# Patient Record
Sex: MALE | Race: WHITE | NOT HISPANIC OR LATINO
[De-identification: names, ages, dates, MRNs, and addresses within clinical notes are randomized per-mention and may not be internally consistent; named-entity substitution may affect disease eponyms.]

---

## 2019-09-14 ENCOUNTER — TRANSCRIPTION ENCOUNTER (OUTPATIENT)
Age: 64
End: 2019-09-14

## 2019-09-15 ENCOUNTER — EMERGENCY (EMERGENCY)
Facility: HOSPITAL | Age: 64
LOS: 1 days | Discharge: HOME | End: 2019-09-15
Attending: EMERGENCY MEDICINE | Admitting: EMERGENCY MEDICINE
Payer: COMMERCIAL

## 2019-09-15 VITALS
HEIGHT: 73 IN | RESPIRATION RATE: 15 BRPM | HEART RATE: 55 BPM | DIASTOLIC BLOOD PRESSURE: 71 MMHG | SYSTOLIC BLOOD PRESSURE: 125 MMHG | OXYGEN SATURATION: 97 % | TEMPERATURE: 98 F | WEIGHT: 184.97 LBS

## 2019-09-15 PROCEDURE — 99284 EMERGENCY DEPT VISIT MOD MDM: CPT

## 2019-09-15 PROCEDURE — 13131 CMPLX RPR F/C/C/M/N/AX/G/H/F: CPT

## 2019-09-15 PROCEDURE — 99285 EMERGENCY DEPT VISIT HI MDM: CPT | Mod: 25

## 2019-09-15 PROCEDURE — 13152 CMPLX RPR E/N/E/L 2.6-7.5 CM: CPT

## 2019-09-15 RX ORDER — TETANUS TOXOID, REDUCED DIPHTHERIA TOXOID AND ACELLULAR PERTUSSIS VACCINE, ADSORBED 5; 2.5; 8; 8; 2.5 [IU]/.5ML; [IU]/.5ML; UG/.5ML; UG/.5ML; UG/.5ML
0.5 SUSPENSION INTRAMUSCULAR ONCE
Refills: 0 | Status: COMPLETED | OUTPATIENT
Start: 2019-09-15 | End: 2019-09-15

## 2019-09-15 NOTE — ED PROVIDER NOTE - CARE PLAN
Breath sounds clear and equal bilaterally.
Principal Discharge DX:	Facial laceration, initial encounter

## 2019-09-15 NOTE — ED PROVIDER NOTE - PATIENT PORTAL LINK FT
You can access the FollowMyHealth Patient Portal offered by [facility] by registering at the following website: [email or web address]. By joining [facility]’s FollowMyHealth portal, you will also be able to view your health information using other applications (apps) compatible with our system.

## 2019-09-15 NOTE — ED ADULT NURSE NOTE - CHIEF COMPLAINT QUOTE
"I fell and hit my head on a stone" patient stated he tripped and fall, no loc, noted a lac on his left eyebrow. MD Ansari aware. "I fell and hit my head on a stone" patient stated he tripped and fall, no loc, noted a lac on his left eyebrow. MD Collado aware.

## 2019-09-15 NOTE — ED PROVIDER NOTE - CLINICAL SUMMARY MEDICAL DECISION MAKING FREE TEXT BOX
left eyebrow lac after fall today. sent to meet plastics for repair. will update tetanus. due to history of factor XI, recommend CT. patient states he consulted his own PCP PTA and was in agreement to avoid CT at this time due to no symptoms or HA. will return immediately for any worsening pain or symptoms. no vert tenderness to suggest vert fx

## 2019-09-15 NOTE — ED ADULT NURSE NOTE - OBJECTIVE STATEMENT
patient fell and has a lac on his left eyebrow, bleeding controlled, refused tetanus shot and ct of head, MD Ansari/plastic surgery aware and on his way for the patient, no loc, c/o minimal pain, girl friend at bedside. will continue to monitor.

## 2019-09-15 NOTE — ED ADULT TRIAGE NOTE - CHIEF COMPLAINT QUOTE
"I fell and hit my head on a stone" patient stated he tripped and fall, no loc, noted a lac on his left eyebrow. MD Ansari aware.

## 2019-09-15 NOTE — ED PROVIDER NOTE - OBJECTIVE STATEMENT
64 year old male with history of Factor XI presents with facial laceration. tripped and fell at home in backyard about 3 hours ago. lac to left eyebrow. no LOC. does not take any blood thinners. no HA, dizziness, confusion, memory loss, or blurred vision. no neck or back pain. no ext pain. unsure of tetanus status. was seen at urgent care PTA and requested plastic surgeon. Was sent to ED to meet Dr. Collado for repair.     PCP Jesús Maynard

## 2019-09-15 NOTE — ED PROVIDER NOTE - PROGRESS NOTE DETAILS
Nakul Ramos for attending Dr. Batista: 3cm t-shaped laceration over left eyebrow s/p fall. No LOC or vision disturbance. Neuro is in tact. pt refused to CT scan. Referred to Dr Collado for suture. I Alicia Ramos attest that this documentation has been prepared under the direction and in the presence of Doctor Batista.

## 2021-11-15 NOTE — ED ADULT NURSE NOTE - NS ED NOTE ABUSE SUSPICION NEGLECT YN
[FreeTextEntry1] : 67 year old M with history of HTN and DMT2 who presents from hospital with complaints of ~30 pounds weight loss over the past month. Said that he has noted a significant decrease in his appetite (currently eats minimal meals twice daily) and as a result has lost ~30 pounds over the past month. Has felt weak as a result but otherwise denied any other significant complaints. \par \par Endocrine consulted for hyperthyroidism with TSH <0.1 TT3 266/275 FT4 5.73. Patient denies palpitations, diarrhea or heat\par intolerance but has been experiencing tremors. No FH of thyroid disease. No Hx of thyroid nodules. No neck pain or flu like symptoms. No cardiac history. Has never been on lithium or amiodarone. Received IV contrast for CT scan done for\par malignancy workup and was found to have a 1.4 cm indeterminate L adrenal nodule. No diaphoresis, headaches or flushing No proximal muscle weakness or easy bruising or bleeding. No uncontrolled hypertension, hyponatremia or hyperkalemia.\par \par \par TSI negative \par Currently off of methimazole therapy. \par Previous TFTs were within normal limits \par Denied any overt hyperthyroid symptoms today \par \par For DM2, no known complications. Takes Janumet 50-1000mg BID. \par Occasionally checks -160 post lunch \par No hypoglycemia \par at home. Follows a diet with high intake of carbs.\par \par After hospital stay he is currently on : \par metoprolol 25 mg bid \par janumet  mg bid \par repaglinide 1 mg TID\par losartan 50 mg daily \par \par \par \par EXAM: US THYROID \par \par PROCEDURE DATE: Oct 7 2020 \par INTERPRETATION: CLINICAL INFORMATION: Hyperthyroidism. Evaluate for thyroid nodules. \par COMPARISON: None available. \par TECHNIQUE: Sonography of the thyroid. \par FINDINGS: \par Right Lobe: 3.1 x 2.0 x 2.1 cm. Diffusely heterogeneous without discrete nodules. Normal vascularity. \par Left Lobe: 3.6 x 1.7 x 2.3 cm. Diffusely heterogeneous without discrete nodules. Normal vascularity. \par Isthmus: 2 mm. \par Cervical Lymph Nodes: No enlarged or abnormal morphology cervical nodes. \par IMPRESSION: \par Diffusely heterogeneous thyroid gland without nodules. \par \par \par MRI ABDOMEN \par ------------------\par ADRENALS: 1.2 x 1.2 cm left adrenal nodule demonstrates signal dropout between in and out of phase images, typical for adrenal adenoma. A smaller adrenal adenoma in the lateral limb of the left adrenal gland measures 1.0 x 0.4 cm.\par \par In the interim, \par -Reported that he had surgery on 04/06 \par -Patient has remained off of methimazole\par -No HP or tremors \par -Weight has been stable\par -No hair loss \par -No abnormal bowel movements \par \par 
No

## 2023-03-30 PROBLEM — Z00.00 ENCOUNTER FOR PREVENTIVE HEALTH EXAMINATION: Status: ACTIVE | Noted: 2023-03-30

## 2023-04-03 PROBLEM — M50.10 HERNIATION OF CERVICAL INTERVERTEBRAL DISC WITH RADICULOPATHY: Status: ACTIVE | Noted: 2023-04-03

## 2023-04-03 PROBLEM — M54.2 NECK PAIN: Status: ACTIVE | Noted: 2023-04-03

## 2023-04-06 ENCOUNTER — APPOINTMENT (OUTPATIENT)
Dept: NEUROSURGERY | Facility: CLINIC | Age: 68
End: 2023-04-06
Payer: COMMERCIAL

## 2023-04-06 ENCOUNTER — OUTPATIENT (OUTPATIENT)
Dept: OUTPATIENT SERVICES | Facility: HOSPITAL | Age: 68
LOS: 1 days | End: 2023-04-06
Payer: COMMERCIAL

## 2023-04-06 ENCOUNTER — NON-APPOINTMENT (OUTPATIENT)
Age: 68
End: 2023-04-06

## 2023-04-06 ENCOUNTER — RESULT REVIEW (OUTPATIENT)
Age: 68
End: 2023-04-06

## 2023-04-06 ENCOUNTER — APPOINTMENT (OUTPATIENT)
Dept: ORTHOPEDIC SURGERY | Facility: CLINIC | Age: 68
End: 2023-04-06

## 2023-04-06 VITALS
SYSTOLIC BLOOD PRESSURE: 126 MMHG | WEIGHT: 192 LBS | HEART RATE: 81 BPM | BODY MASS INDEX: 25.45 KG/M2 | HEIGHT: 73 IN | TEMPERATURE: 97 F | OXYGEN SATURATION: 98 % | RESPIRATION RATE: 17 BRPM | DIASTOLIC BLOOD PRESSURE: 86 MMHG

## 2023-04-06 DIAGNOSIS — M48.02 SPINAL STENOSIS, CERVICAL REGION: ICD-10-CM

## 2023-04-06 DIAGNOSIS — Z86.79 PERSONAL HISTORY OF OTHER DISEASES OF THE CIRCULATORY SYSTEM: ICD-10-CM

## 2023-04-06 DIAGNOSIS — D68.1 HEREDITARY FACTOR XI DEFICIENCY: ICD-10-CM

## 2023-04-06 DIAGNOSIS — M50.10 CERVICAL DISC DISORDER WITH RADICULOPATHY, UNSPECIFIED CERVICAL REGION: ICD-10-CM

## 2023-04-06 DIAGNOSIS — R20.2 ANESTHESIA OF SKIN: ICD-10-CM

## 2023-04-06 DIAGNOSIS — M54.2 CERVICALGIA: ICD-10-CM

## 2023-04-06 DIAGNOSIS — R20.0 ANESTHESIA OF SKIN: ICD-10-CM

## 2023-04-06 DIAGNOSIS — Z87.891 PERSONAL HISTORY OF NICOTINE DEPENDENCE: ICD-10-CM

## 2023-04-06 PROCEDURE — 99203 OFFICE O/P NEW LOW 30 MIN: CPT

## 2023-04-06 PROCEDURE — 72052 X-RAY EXAM NECK SPINE 6/>VWS: CPT

## 2023-04-06 PROCEDURE — 72052 X-RAY EXAM NECK SPINE 6/>VWS: CPT | Mod: 26

## 2023-04-06 RX ORDER — DIGOXIN 500 MCG
TABLET ORAL
Refills: 0 | Status: ACTIVE | COMMUNITY

## 2023-04-06 RX ORDER — DEXLANSOPRAZOLE 30 MG/1
30 CAPSULE, DELAYED RELEASE ORAL
Refills: 0 | Status: ACTIVE | COMMUNITY

## 2023-04-06 RX ORDER — VALACYCLOVIR HYDROCHLORIDE 1 G/1
TABLET, FILM COATED ORAL
Refills: 0 | Status: ACTIVE | COMMUNITY

## 2023-04-06 RX ORDER — LEVOTHYROXINE SODIUM 137 UG/1
TABLET ORAL
Refills: 0 | Status: ACTIVE | COMMUNITY

## 2023-04-06 RX ORDER — METOPROLOL TARTRATE 50 MG/1
TABLET ORAL
Refills: 0 | Status: ACTIVE | COMMUNITY

## 2023-04-17 NOTE — REVIEW OF SYSTEMS
[As Noted in HPI] : as noted in HPI [Numbness] : numbness [Fever] : no fever [Chills] : no chills [Arm Weakness] : no arm weakness [Hand Weakness] : no hand weakness [Leg Weakness] : no leg weakness [Seizures] : no convulsions [Dizziness] : no dizziness [Difficulty Walking] : no difficulty walking [Chest Pain] : no chest pain [Palpitations] : no palpitations [Shortness Of Breath] : no shortness of breath

## 2023-04-17 NOTE — ASSESSMENT
[FreeTextEntry1] : PLAN: \par - Xray cervical spine with flex/ex ordered, without evidence of instability\par - Physical therapy referral provided\par - Continue f/u with Dr. Mejia\par - RTC prn\par \par \par I, Dr. Reyes, personally performed the evaluation and management (E/M) services for this established patient who presents today with (a) new problem(s)/exacerbation of (an) existing condition(s). That E/M includes conducting the examination, assessing all new/exacerbated conditions, and establishing a new plan of care. Today, my ACP, Matilda Ventura, was here to observe my evaluation and management services for this new problem/exacerbated condition to be followed going forward.\par \par

## 2023-04-17 NOTE — PHYSICAL EXAM
[General Appearance - Alert] : alert [General Appearance - In No Acute Distress] : in no acute distress [General Appearance - Well-Appearing] : healthy appearing [Oriented To Time, Place, And Person] : oriented to person, place, and time [Impaired Insight] : insight and judgment were intact [Affect] : the affect was normal [Memory Recent] : recent memory was not impaired [Motor Tone] : muscle tone was normal in all four extremities [Motor Strength] : muscle strength was normal in all four extremities [Motor Handedness Right-Handed] : the patient is right hand dominant [Sensation Tactile Decrease] : light touch was intact [2+] : Brachioradialis left 2+ [Sclera] : the sclera and conjunctiva were normal [PERRL With Normal Accommodation] : pupils were equal in size, round, reactive to light, with normal accommodation [Neck Appearance] : the appearance of the neck was normal [] : no respiratory distress [Respiration, Rhythm And Depth] : normal respiratory rhythm and effort [Abnormal Walk] : normal gait [Skin Color & Pigmentation] : normal skin color and pigmentation [Jorgensen] : Jorgensen's sign was not demonstrated

## 2023-04-17 NOTE — HISTORY OF PRESENT ILLNESS
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 Tijerina @ [facility]) 12 yrs ago, lumbar diskectomy approx 5 years ago with Dr. Yates @ [facility] who presents today for evaluation of numbness/tingling of bilateral fingertips and intermittent arm numbness. \par \par Pt endorses about 5 years of bilateral fingertip numbness that alternates right vs left. tingling is to all fingertips. Denies one side worse than the other. Also with intermittent arm numbness that he wakes up with during the middle of the night. Sometimes right and others left. \par He has been following with neurologist Dr. Mejia. \par He had MRI cervical spine done 11/23/22 with report of multiple level disc herniations that have progressed since study done 2020. He was sent for neurosurgical evaluation. \par \par Denies neck pain, balance issues, dizziness, weakness of upper/lower extremities. \par Notes urinary urgency at night. \par

## 2023-04-17 NOTE — ADDENDUM
[FreeTextEntry1] : PATIENT:	Dereck Nolasco 				\par \par \par Thursday, April 6, 2023\par \par I saw Alejandro in the office today.  We spent approximately half an hour discussing his care.  He is an otherwise healthy 67-year-old male with a complaint of hand numbness particularly at night with MRI evidence of cervical stenosis.  Dereck really has no other major medical problems.  He is on some antihypertensive medicines as well as digoxin but has no history of significant surgery or significant health difficulties.  He has had a lumbar discectomy in the past and is currently  with 2 children and works in [employer] in [facility].  He is otherwise doing well.  \par \par Neurologically, Mr. Nolasco has slightly increased reflexes at the biceps and triceps but with normal strength and normal lower extremity reflexes and no evidence of long tract symptomatology, and his gait is normal.  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  There is some ligamentous buckling at C3-4 posteriorly.\par \par I had a long discussion with Mr. Nolasco regarding the best way to proceed.  I do not see any clear cervical stenosis or cord compression that would cause any hand numbness, but there is evidence of significant osteoarthropathy anteriorly in the spine, and given his complaints, I think it is important that we do a flexion-extension x-ray.  This will give us an idea if he has any dynamic instability that might be responsible for his current symptoms.  Once we get his x-rays, we will be better able to recommend a plan of care. \par \par \par \par Jesús Reyes MD\par \par DL/ag [facility] #[id number]\par

## 2025-04-13 ENCOUNTER — NON-APPOINTMENT (OUTPATIENT)
Age: 70
End: 2025-04-13

## 2025-08-14 ENCOUNTER — NON-APPOINTMENT (OUTPATIENT)
Age: 70
End: 2025-08-14